# Patient Record
(demographics unavailable — no encounter records)

---

## 2025-01-28 NOTE — ASSESSMENT
[FreeTextEntry1] : 1. concern for rotator cuff tear do to pain and weakness on examination and pain. we discussed the rotator cuff tears vs tendonitis in detail today. given 1 year history and weakness/pain we will obtain MRI. if rotator cuff tear likely referral to my shoulder partner.   2.   cmc arthritis left hand -We discussed the etiology of the patients symptoms and reviewed  x-rays today.  We discussed the natural history of CMC arthritis and different forms of treatment.  We discussed nonoperative management with bracing, NSAIDs.  We discussed steroid injection into the joint.  We discussed this could be performed every 3 to 4 months but no sooner.  We discussed there is a surgical procedure as well however I would not consider this at this time. - he deferred injection today, he is going to pursure bracing, given several options today  follow up 1 month

## 2025-01-28 NOTE — PHYSICAL EXAM
[de-identified] : left  hand and wrist-Hand and digits warm well-perfused, no abrasion injury erythema redness or drainage -Denies numbness in the radial, ulnar, or median nerve distribution, full range of motion of the hand and wrist able to make a full composite fist. - tenderness over thumb CMC, positive CMC grind no hyperextension at MCP  no tenderness over thumb A1 pulley - neg Finkelstein's test, pain over the first dorsal compartment.   left Shoulder 4/5 strength shoulder abduction/forward flexion/internal and external rotation positive Olivares and Neer test Full active and passive range of motion of the hand and wrist, no numbness or tingling throughout upper extremity no ttp over bicep tendon [de-identified] : 4 view x-ray obtained of the left shoulder today including AP, scapular Y, Grashey, and axillary views, these were personally reviewed.  Glenohumeral joint space maintained without osteophytes, no fracture or dislocation, AC joint without joint space narrowing  left wrsit xrays (wei) - early cmc arthrtic change

## 2025-01-28 NOTE — HISTORY OF PRESENT ILLNESS
[FreeTextEntry1] : 50 year old male presenting for evaluation of left shoulder and left thumb pain. pain has been on going for it for 1 year. no history of injury trauma. pain is worse wit activity and alleviated with rest.   his left shoulder he reports weakness with overhead activity. no numbness or tinging. for his thumb he points at the bsae of his thumb cmc joint.

## 2025-02-25 NOTE — HISTORY OF PRESENT ILLNESS
[de-identified] : ARIELLE is a 50 year male here today for initial visit due to L shoulder. Patient states he has had pain for three years to his shoulder. Denies traumatic mechanism of injury. He was following with Dr. Stanley for thumb and L shoulder and was then referred to the office today for further evaluation. Dr. Stanley ordered the patient an MRI which the patient presents with today for review from [facility].

## 2025-02-25 NOTE — CONSULT LETTER
[Dear  ___] : Dear  [unfilled], [Consult Letter:] : I had the pleasure of evaluating your patient, [unfilled]. [Please see my note below.] : Please see my note below. [Sincerely,] : Sincerely, [FreeTextEntry3] : Dr. Lan Tracy

## 2025-02-25 NOTE — HISTORY OF PRESENT ILLNESS
[de-identified] : ARIELLE is a 50 year male here today for initial visit due to L shoulder. Patient states he has had pain for three years to his shoulder. Denies traumatic mechanism of injury. He was following with Dr. Stanley for thumb and L shoulder and was then referred to the office today for further evaluation. Dr. Stanley ordered the patient an MRI which the patient presents with today for review from [facility].

## 2025-02-25 NOTE — PHYSICAL EXAM
[de-identified] : General: Well appearing, no acute distress Neurologic: A&Ox3, No focal deficits Head: NCAT without abrasions, lacerations, or ecchymosis to head, face, or scalp Eyes: No scleral icterus, no gross abnormalities Respiratory: Equal chest wall expansion bilaterally, no accessory muscle use Lymphatic: No lymphadenopathy palpated Skin: Warm and dry Psychiatric: Normal mood and affect  Examination of the left shoulder reveals no obvious deformity.  There is no evidence of muscle atrophy.  The skin is intact.  There is no swelling, erythema, or ecchymosis.  The patient is nontender to palpation throughout the left shoulder including the anterior aspect of the shoulder near the bicipital groove, a.c. joint, trapezius and posterior shoulder.  The patient's active range of motion is as follows: Forward flexion to 175 degrees external rotation to 75 degrees and internal rotation to an upper lumbar level.  The patient has 5 out of 5 strength to forward flexion with pronation. 4/5 strength to internal and external rotation with pain  3/5 strength to abduction. The patient has negative Olivares and Neer's tests.  There is no pain with cross body abduction testing.  There is a negative a.c. compression test and no sulcus sign present.  There is a negative liftoff test and negative yergason test.  The compartments of the arm are soft and nontender without evidence of DVT or compartment syndrome.  The patient is grossly neurovascularly intact distally.  [de-identified] : MRI L Shoulder [facility] DOS: 2/7/2025  Impression: Chronic complete supraspinatus tendon tear with medial retraction of the torn fibers and mild secondary subacromial-subdeltoid bursitis. Moderate infraspinatus and moderate-severe subscapularis tendinosis. Mild biceps tenosynovitis with findings suggestive of biceps pulley injury.

## 2025-02-25 NOTE — ADDENDUM
[FreeTextEntry1] : Documented by Chloe Elam acting as a scribe for Dr. Tracy and Yonny Olvera PA-C on 02/25/2025. All medical record entries made by the Scribe were at my, Dr. Tracy, and Yonny Olvera's, direction and personally dictated by me on 02/25/2025. I have reviewed the chart and agree that the record accurately reflects my personal performance of the history, physical exam, procedure and imaging.

## 2025-02-25 NOTE — DISCUSSION/SUMMARY
[de-identified] : We had a thorough discussion regarding the nature of his pain, the pathophysiology, as well as all treatment options. I discussed operative and non-operative treatment modalities. Pt due to his acute pain elected for a corticosteroid injection at today's visit and tolerated the procedure well. He should take it easy for the next 2-3 days while icing the joint. Patient was given prescription of formal physical therapy that he will perform 2x/wk for 6-8 wks.   All questions were answered and the patient verbalized understanding. The patient is in agreement with this treatment plan. Patient will follow up in 6-8 wks for repeat clinical assessment.

## 2025-02-25 NOTE — REASON FOR VISIT
[Initial Visit] : an initial visit for [Shoulder Pain] : shoulder pain [FreeTextEntry2] : L shoulder

## 2025-02-25 NOTE — PHYSICAL EXAM
[de-identified] : General: Well appearing, no acute distress Neurologic: A&Ox3, No focal deficits Head: NCAT without abrasions, lacerations, or ecchymosis to head, face, or scalp Eyes: No scleral icterus, no gross abnormalities Respiratory: Equal chest wall expansion bilaterally, no accessory muscle use Lymphatic: No lymphadenopathy palpated Skin: Warm and dry Psychiatric: Normal mood and affect  Examination of the left shoulder reveals no obvious deformity.  There is no evidence of muscle atrophy.  The skin is intact.  There is no swelling, erythema, or ecchymosis.  The patient is nontender to palpation throughout the left shoulder including the anterior aspect of the shoulder near the bicipital groove, a.c. joint, trapezius and posterior shoulder.  The patient's active range of motion is as follows: Forward flexion to 175 degrees external rotation to 75 degrees and internal rotation to an upper lumbar level.  The patient has 5 out of 5 strength to forward flexion with pronation. 4/5 strength to internal and external rotation with pain  3/5 strength to abduction. The patient has negative Olivares and Neer's tests.  There is no pain with cross body abduction testing.  There is a negative a.c. compression test and no sulcus sign present.  There is a negative liftoff test and negative yergason test.  The compartments of the arm are soft and nontender without evidence of DVT or compartment syndrome.  The patient is grossly neurovascularly intact distally.  [de-identified] : MRI L Shoulder [facility] DOS: 2/7/2025  Impression: Chronic complete supraspinatus tendon tear with medial retraction of the torn fibers and mild secondary subacromial-subdeltoid bursitis. Moderate infraspinatus and moderate-severe subscapularis tendinosis. Mild biceps tenosynovitis with findings suggestive of biceps pulley injury.

## 2025-02-25 NOTE — DISCUSSION/SUMMARY
[de-identified] : We had a thorough discussion regarding the nature of his pain, the pathophysiology, as well as all treatment options. I discussed operative and non-operative treatment modalities. Pt due to his acute pain elected for a corticosteroid injection at today's visit and tolerated the procedure well. He should take it easy for the next 2-3 days while icing the joint. Patient was given prescription of formal physical therapy that he will perform 2x/wk for 6-8 wks.   All questions were answered and the patient verbalized understanding. The patient is in agreement with this treatment plan. Patient will follow up in 6-8 wks for repeat clinical assessment.

## 2025-02-25 NOTE — PROCEDURE
[de-identified] : US guided Injection: Left shoulder (Subacromial). Indication: pain.  A discussion was had with the patient regarding this procedure and all questions were answered. All risks, benefits and alternatives were discussed. These included but were not limited to bleeding, infection, and allergic reaction. Alcohol was used to clean the skin, and betadine was used to sterilize and prep the area in the posterior aspect of the left shoulder. Ethyl chloride spray was then used as a topical anesthetic. A 22-gauge needle was used to inject 3cc 1% xylocaine, 2cc 0.25% bupivacaine and 1cc of 40mg/mL depomedrol into the left subacromial space. An ultrasound guidance was used for adequate placement of needle. A sterile bandage was then applied. The patient tolerated the procedure well and there were no complications.

## 2025-02-25 NOTE — PROCEDURE
[de-identified] : US guided Injection: Left shoulder (Subacromial). Indication: pain.  A discussion was had with the patient regarding this procedure and all questions were answered. All risks, benefits and alternatives were discussed. These included but were not limited to bleeding, infection, and allergic reaction. Alcohol was used to clean the skin, and betadine was used to sterilize and prep the area in the posterior aspect of the left shoulder. Ethyl chloride spray was then used as a topical anesthetic. A 22-gauge needle was used to inject 3cc 1% xylocaine, 2cc 0.25% bupivacaine and 1cc of 40mg/mL depomedrol into the left subacromial space. An ultrasound guidance was used for adequate placement of needle. A sterile bandage was then applied. The patient tolerated the procedure well and there were no complications.

## 2025-06-23 NOTE — HISTORY OF PRESENT ILLNESS
[de-identified] : c/o discomfort with eating - has issues with swallowing.  Problem for about 5 mos.  No problems with liquids - worse with solid foods.  No hemoptyis or sputum - was smoker - until one year ago - occ cigars.   Also c/o problems hearing - hx of loud music exposure -  noted for past few years.

## 2025-06-23 NOTE — REASON FOR VISIT
[Initial Consultation] : an initial consultation for [Hearing Loss] : hearing loss [Dysphagia] : dysphagia [Spouse] : spouse [FreeTextEntry2] : throat / ears

## 2025-06-23 NOTE — REVIEW OF SYSTEMS
[Ear Itch] : ear itch [Hearing Loss] : hearing loss [Swelling Neck] : swelling neck [Negative] : Heme/Lymph [Ear Drainage] : no ear drainage [Ear Noises] : no ear noises [Hoarseness] : no hoarseness [Throat Clearing] : no throat clearing [Throat Pain] : no throat pain [Throat Dryness] : no throat dryness [Throat Itching] : no throat itching [de-identified] : trouble swallowing food , dysphagia

## 2025-06-23 NOTE — ASSESSMENT
[FreeTextEntry1] : Patient with c/o throat discomfort and occ issues with swallowing- worse with solid foods.  No hemoptysis - no breathng problems.  Also concerned about hearing. Exam normal except for findings of LPR - has normal hearing.  No laryngeal lesion seen.  Reassured about hearing and recommended yearly audio.  Also recommended reflux diet for LPR and omeprazole before breakfast - followup 2 mos - if no improvement will recommend laryngology eval and MBS.

## 2025-06-23 NOTE — DATA REVIEWED
[de-identified] : Type As tymps, AU. Testing via inserts:  - 8000Hz, AU. Recs: 1) F/u w/ MD 2) Use hearing protection while working 3) Annual